# Patient Record
Sex: FEMALE | Race: AMERICAN INDIAN OR ALASKA NATIVE
[De-identification: names, ages, dates, MRNs, and addresses within clinical notes are randomized per-mention and may not be internally consistent; named-entity substitution may affect disease eponyms.]

---

## 2018-03-02 ENCOUNTER — HOSPITAL ENCOUNTER (EMERGENCY)
Dept: HOSPITAL 31 - C.ER | Age: 23
Discharge: HOME | End: 2018-03-02
Payer: MEDICAID

## 2018-03-02 VITALS — OXYGEN SATURATION: 98 % | TEMPERATURE: 97.1 F | SYSTOLIC BLOOD PRESSURE: 132 MMHG | DIASTOLIC BLOOD PRESSURE: 70 MMHG

## 2018-03-02 VITALS — HEART RATE: 84 BPM | RESPIRATION RATE: 20 BRPM

## 2018-03-02 DIAGNOSIS — R11.0: ICD-10-CM

## 2018-03-02 DIAGNOSIS — D64.9: ICD-10-CM

## 2018-03-02 DIAGNOSIS — R10.13: Primary | ICD-10-CM

## 2018-03-02 LAB
ALBUMIN SERPL-MCNC: 3.9 G/DL (ref 3.5–5)
ALBUMIN/GLOB SERPL: 1.2 {RATIO} (ref 1–2.1)
ALT SERPL-CCNC: 22 U/L (ref 9–52)
AST SERPL-CCNC: 19 U/L (ref 14–36)
BACTERIA #/AREA URNS HPF: (no result) /[HPF]
BASOPHILS # BLD AUTO: 0 K/UL (ref 0–0.2)
BASOPHILS NFR BLD: 0.5 % (ref 0–2)
BILIRUB UR-MCNC: NEGATIVE MG/DL
BUN SERPL-MCNC: 12 MG/DL (ref 7–17)
CALCIUM SERPL-MCNC: 8.9 MG/DL (ref 8.6–10.4)
EOSINOPHIL # BLD AUTO: 0.1 K/UL (ref 0–0.7)
EOSINOPHIL NFR BLD: 0.6 % (ref 0–4)
ERYTHROCYTE [DISTWIDTH] IN BLOOD BY AUTOMATED COUNT: 16.7 % (ref 11.5–14.5)
GFR NON-AFRICAN AMERICAN: > 60
GLUCOSE UR STRIP-MCNC: NORMAL MG/DL
HCG,QUALITATIVE URINE: NEGATIVE
HGB BLD-MCNC: 9.1 G/DL (ref 11–16)
LEUKOCYTE ESTERASE UR-ACNC: NEGATIVE LEU/UL
LIPASE: 133 U/L (ref 23–300)
LYMPHOCYTES # BLD AUTO: 2.4 K/UL (ref 1–4.3)
LYMPHOCYTES NFR BLD AUTO: 29.2 % (ref 20–40)
MCH RBC QN AUTO: 25.5 PG (ref 27–31)
MCHC RBC AUTO-ENTMCNC: 32.8 G/DL (ref 33–37)
MCV RBC AUTO: 77.7 FL (ref 81–99)
MONOCYTES # BLD: 0.7 K/UL (ref 0–0.8)
MONOCYTES NFR BLD: 9 % (ref 0–10)
NEUTROPHILS # BLD: 5 K/UL (ref 1.8–7)
NEUTROPHILS NFR BLD AUTO: 60.7 % (ref 50–75)
NRBC BLD AUTO-RTO: 0 % (ref 0–2)
PH UR STRIP: 6 [PH] (ref 5–8)
PLATELET # BLD: 291 K/UL (ref 130–400)
PMV BLD AUTO: 7.8 FL (ref 7.2–11.7)
PROT UR STRIP-MCNC: NEGATIVE MG/DL
RBC # BLD AUTO: 3.59 MIL/UL (ref 3.8–5.2)
RBC # UR STRIP: NEGATIVE /UL
SP GR UR STRIP: 1.02 (ref 1–1.03)
SQUAMOUS EPITHIAL: 8 /HPF (ref 0–5)
URINE NITRATE: NEGATIVE
UROBILINOGEN UR-MCNC: 4 MG/DL (ref 0.2–1)
WBC # BLD AUTO: 8.2 K/UL (ref 4.8–10.8)

## 2018-03-02 PROCEDURE — 84703 CHORIONIC GONADOTROPIN ASSAY: CPT

## 2018-03-02 PROCEDURE — 99285 EMERGENCY DEPT VISIT HI MDM: CPT

## 2018-03-02 PROCEDURE — 74176 CT ABD & PELVIS W/O CONTRAST: CPT

## 2018-03-02 PROCEDURE — 80053 COMPREHEN METABOLIC PANEL: CPT

## 2018-03-02 PROCEDURE — 85025 COMPLETE CBC W/AUTO DIFF WBC: CPT

## 2018-03-02 PROCEDURE — 96361 HYDRATE IV INFUSION ADD-ON: CPT

## 2018-03-02 PROCEDURE — 96374 THER/PROPH/DIAG INJ IV PUSH: CPT

## 2018-03-02 PROCEDURE — 81001 URINALYSIS AUTO W/SCOPE: CPT

## 2018-03-02 PROCEDURE — 83690 ASSAY OF LIPASE: CPT

## 2018-03-02 NOTE — C.PDOC
History Of Present Illness





22 yo female come in for evaluation of nausea for past week. Pt sts, "today, 

felt worse, almost vomited. developed some headache, back pain. Otherwise, pt 

denies change in appetite, recent illness, fever, chills, sore throat, cough, CP

, SOB, dyspnea, wheezing, vomiting, diarrhea, UTi sx. LNMP -2/18/18. Ambulate 

to ED for evaluation, not in any apparent distress.


Time Seen by Provider: 03/02/18 00:42


Chief Complaint (Nursing): Abdominal Pain


History Per: Patient





Past Medical History


Reviewed: Historical Data, Nursing Documentation, Vital Signs


Vital Signs: 


 Last Vital Signs











Temp  98 F   03/02/18 00:11


 


Pulse  84   03/02/18 00:11


 


Resp  20   03/02/18 00:11


 


BP  103/47 L  03/02/18 00:11


 


Pulse Ox  100   03/02/18 00:53














- Medical History


PMH: No Chronic Diseases


Surgical History: No Surg Hx


Family History: States: No Known Family Hx





- Social History


Hx Alcohol Use: No


Hx Substance Use: No





- Immunization History


Hx Tetanus Toxoid Vaccination: No


Hx Pneumococcal Vaccination: No





Review Of Systems


Except As Marked, All Systems Reviewed And Found Negative.


Constitutional: Positive for: Chills.  Negative for: Fever


ENT: Negative for: Ear Discharge, Nose Discharge, Nose Congestion, Throat Pain, 

Throat Swelling


Cardiovascular: Negative for: Chest Pain


Respiratory: Negative for: Cough, Shortness of Breath, Wheezing


Gastrointestinal: Positive for: Nausea, Abdominal Pain.  Negative for: Vomiting

, Diarrhea, Melena, Hematochezia, Hematemesis


Genitourinary: Negative for: Dysuria, Frequency


Musculoskeletal: Positive for: Back Pain.  Negative for: Neck Pain


Skin: Negative for: Rash


Neurological: Positive for: Headache.  Negative for: Weakness, Numbness, 

Altered Mental Status, Dizziness





Physical Exam





- Physical Exam


Appears: Well, Non-toxic, No Acute Distress


Skin: Normal Color, Warm, Dry, No Rash


Head: Normacephalic


Eye(s): bilateral: PERRL


Nose: No Flaring, No Discharge


Oral Mucosa: Moist, No Drooling


Throat: No Erythema


Neck: Trachea Midline, Supple


Cardiovascular: Rhythm Regular


Respiratory: No Decreased Breath Sounds, No Accessory Muscle Use, No Stridor, 

No Wheezing


Gastrointestinal/Abdominal: Soft, Tenderness (mild epigastric), No Distention, 

No Guarding, No Rebound


Back: No CVA Tenderness


Extremity: Normal ROM, No Deformity, No Swelling


Neurological/Psych: Oriented x3, Normal Speech





ED Course And Treatment





- Laboratory Results


Result Diagrams: 


 03/02/18 01:32





 03/02/18 01:32


Urine Pregnancy POC: Negative


O2 Sat by Pulse Oximetry: 100


Pulse Ox Interpretation: Normal





- CT Scan/US


  ** CT abd/pelvis


Other Rad Studies (CT/US): Radiology Report Reviewed


CT/US Interpretation: Pt ois allergic to shelfis.  Study dry, no contrast.  EXAM

:  CT Abdomen and Pelvis Without Intravenous Contrast.  CLINICAL HISTORY:  23 

years old, female; Pain; Abdominal pain; Additional info: Llq pain.  TECHNIQUE:

  Axial computed tomography images of the abdomen and pelvis without 

intravenous contrast. All CT.  scans at this facility use one or more dose 

reduction techniques, viz.: automated exposure control;.  ma/kV adjustment per 

patient size (including targeted exams where dose is matched to indication; 

i.e.  head); or iterative reconstruction technique.  Coronal and sagittal 

reformatted images were created and reviewed.  COMPARISON:  No relevant prior 

studies available.  FINDINGS:  Lower thorax: No acute findings.  ABDOMEN:  Liver

: Unremarkable.  Gallbladder and bile ducts: No calcified stones. No ductal 

dilation.  Pancreas: Unremarkable. No ductal dilation.  Spleen: No 

splenomegaly.  Adrenals: No mass.  Kidneys and ureters: No renal calculi. No 

hydronephrosis.  Stomach and bowel: Apparent mild mural thickening vs 

underdistention of distal rectum. No.  obstruction.  Appendix: Normal caliber. 

No definite inflammation.  PELVIS:  Bladder: Unremarkable. No stones.  

Reproductive: Suboptimal delineation of uterus and adnexa.  ABDOMEN and PELVIS:

  Intraperitoneal space: Small free fluid within pelvis. No free air.  Bones/

joints: No acute fracture.  Soft tissues: Unremarkable.  Vasculature: 

Unremarkable. No aneurysm.  Lymph nodes: No pathologically enlarged lymph 

nodes.  IMPRESSION:  1. Small pelvic ascites with suboptimal delineation of 

uterus/adnexa. Suggest ultrasound.  2. Possible mild proctitis versus 

underdistention. Clinical correlation is needed.  3. Incidental/non-acute 

findings are described above.  Thank you for allowing us to participate in the 

care of your patient


Progress Note: Pt was OBS in Ed for 3 hours and remained stable.  On re-eval, 

pt is afberile, hemodynamicaly stable.  Non-toxic.  Tolerate PO well in ED.  

PulseEOx 100% RA.  ENT: no acute findings.  neck: SUpple, (-) meningeal sign, (-

) JVD.  Lungs: CTA B/L, BS equal B/L.  CVS: (+)S1S2, reg.  Abd: benign, (-) 

guarding, (-) rebound.  back: (-) CVA tenderness.  Blood owrk review, low HB 

noted. Pt admits, has hx of anemia.  Otherwise, diagnostics appeafs without 

acute findings.  UA- normal study.  Imaging review and discussed with pt, 

recommend on outpt bases,  F/U outpt with GYN for Pelvic US, ottherwise, no 

acute findings noted.  Pt has cliunical findings c/w nausea, epigastric pain, 

low Hb, hx of anemia.  Pt advised.  ref. to  F/u with PMD, GI, Hematlogy and 

GYN in 2-3 days for re-eavl.  return to ED if any worsening or new changes.





Disposition


Counseled Patient/Family Regarding: Studies Performed, Diagnosis, Need For 

Followup, Rx Given





- Disposition


Referrals: 


[facility] [Outside]


Disposition: HOME/ ROUTINE


Disposition Time: 03:42


Condition: STABLE


Additional Instructions: 


ENCOURAGE FLUIDS


DIET RESTRICTION FOR 1 WEEK, AVOID FAT, GREASY FOOD


ENCOURAGE FLUIDS


TAKE MEDICATION AS PRESCRIBED


FOLLOW UP WITH PMD, HEMATOLOGIST IN 1-2 DAYS FOR RE-EVALUATION.


RETURN TO ED IF ANY WORSENING OR NEW CHANGES.


Prescriptions: 


Ondansetron ODT [Zofran ODT] 1 odt PO BID PRN #6 odt


 PRN Reason: Nausea/Vomiting


Pantoprazole Sodium [Protonix] 20 mg PO BID #20 tablet.


Instructions:  Anemia Caused by Low Iron, Nausea and Vomiting, Adult, Dyspepsia 

(DC)


Forms:  [facility] (English)





- Clinical Impression


Clinical Impression: 


 Nausea, Epigastric pain, Anemia

## 2018-03-02 NOTE — CT
EXAM:

  CT Abdomen and Pelvis Without Intravenous Contrast



CLINICAL HISTORY:

  23 years old, female; Pain; Abdominal pain; Additional info: Llq pain



TECHNIQUE:

  Axial computed tomography images of the abdomen and pelvis without 

intravenous contrast.  All CT scans at this facility use one or more dose 

reduction techniques, viz.: automated exposure control; ma/kV adjustment per 

patient size (including targeted exams where dose is matched to indication; 

i.e. head); or iterative reconstruction technique.

  Coronal and sagittal reformatted images were created and reviewed.



COMPARISON:

  No relevant prior studies available.



FINDINGS:

  Lower thorax: No acute findings.



 ABDOMEN:

  Liver:  Unremarkable.

  Gallbladder and bile ducts:  No calcified stones.  No ductal dilation.

  Pancreas:  Unremarkable.  No ductal dilation.

  Spleen:  No splenomegaly.

  Adrenals:  No mass.

  Kidneys and ureters:  No renal calculi.  No hydronephrosis.

  Stomach and bowel:  Apparent mild mural thickening vs underdistention of 

distal rectum.  No obstruction.

  Appendix:  Normal caliber.  No definite inflammation.



 PELVIS:

  Bladder:  Unremarkable.  No stones.

  Reproductive:  Suboptimal delineation of uterus and adnexa.



 ABDOMEN and PELVIS:

  Intraperitoneal space:  Small free fluid within pelvis.  No free air.

  Bones/joints:  No acute fracture.

  Soft tissues:  Unremarkable.

  Vasculature:  Unremarkable.  No aneurysm.

  Lymph nodes:  No pathologically enlarged lymph nodes.



IMPRESSION:     

1.  Small pelvic ascites with suboptimal delineation of uterus/adnexa. Suggest 

ultrasound.

2.  Possible mild proctitis versus underdistention.  Clinical correlation is 

needed.

3.  Incidental/non-acute findings are described above.

## 2018-03-06 ENCOUNTER — HOSPITAL ENCOUNTER (EMERGENCY)
Dept: HOSPITAL 31 - C.ER | Age: 23
Discharge: HOME | End: 2018-03-06
Payer: MEDICAID

## 2018-03-06 VITALS
RESPIRATION RATE: 18 BRPM | TEMPERATURE: 99.8 F | DIASTOLIC BLOOD PRESSURE: 56 MMHG | SYSTOLIC BLOOD PRESSURE: 92 MMHG | HEART RATE: 71 BPM

## 2018-03-06 VITALS — OXYGEN SATURATION: 98 %

## 2018-03-06 DIAGNOSIS — J11.1: Primary | ICD-10-CM

## 2018-03-06 LAB
ANISOCYTOSIS BLD QL SMEAR: SLIGHT
BACTERIA #/AREA URNS HPF: (no result) /[HPF]
BASOPHILS # BLD AUTO: 0 K/UL (ref 0–0.2)
BASOPHILS NFR BLD: 0.2 % (ref 0–2)
BILIRUB UR-MCNC: NEGATIVE MG/DL
BUN SERPL-MCNC: 12 MG/DL (ref 7–17)
CALCIUM SERPL-MCNC: 9.1 MG/DL (ref 8.6–10.4)
EOSINOPHIL # BLD AUTO: 0 K/UL (ref 0–0.7)
EOSINOPHIL NFR BLD: 0 % (ref 0–4)
ERYTHROCYTE [DISTWIDTH] IN BLOOD BY AUTOMATED COUNT: 16.8 % (ref 11.5–14.5)
GFR NON-AFRICAN AMERICAN: > 60
GLUCOSE UR STRIP-MCNC: NORMAL MG/DL
HCG,QUALITATIVE URINE: NEGATIVE
HGB BLD-MCNC: 9.8 G/DL (ref 11–16)
HYPOCHROMIC: SLIGHT
LEUKOCYTE ESTERASE UR-ACNC: (no result) LEU/UL
LYMPHOCYTE: 5 % (ref 20–40)
LYMPHOCYTES # BLD AUTO: 0.6 K/UL (ref 1–4.3)
LYMPHOCYTES NFR BLD AUTO: 5.2 % (ref 20–40)
MCH RBC QN AUTO: 25.1 PG (ref 27–31)
MCHC RBC AUTO-ENTMCNC: 32.3 G/DL (ref 33–37)
MCV RBC AUTO: 77.9 FL (ref 81–99)
MICROCYTES BLD QL SMEAR: SLIGHT
MONOCYTE: 11 % (ref 0–10)
MONOCYTES # BLD: 1.1 K/UL (ref 0–0.8)
MONOCYTES NFR BLD: 9.4 % (ref 0–10)
NEUTROPHILS # BLD: 10 K/UL (ref 1.8–7)
NEUTROPHILS NFR BLD AUTO: 84 % (ref 50–75)
NEUTROPHILS NFR BLD AUTO: 85.2 % (ref 50–75)
NRBC BLD AUTO-RTO: 0 % (ref 0–2)
PH UR STRIP: 5 [PH] (ref 5–8)
PLATELET # BLD EST: NORMAL 10*3/UL
PLATELET # BLD: 280 K/UL (ref 130–400)
PMV BLD AUTO: 7.1 FL (ref 7.2–11.7)
POIKILOCYTOSIS BLD QL SMEAR: SLIGHT
PROT UR STRIP-MCNC: NEGATIVE MG/DL
RBC # BLD AUTO: 3.89 MIL/UL (ref 3.8–5.2)
RBC # UR STRIP: NEGATIVE /UL
SP GR UR STRIP: 1.03 (ref 1–1.03)
SQUAMOUS EPITHIAL: 10 /HPF (ref 0–5)
TARGETS BLD QL SMEAR: SLIGHT
TOTAL CELLS COUNTED BLD: 100
URINE NITRATE: NEGATIVE
UROBILINOGEN UR-MCNC: 2 MG/DL (ref 0.2–1)
WBC # BLD AUTO: 11.7 K/UL (ref 4.8–10.8)

## 2018-03-06 PROCEDURE — 96361 HYDRATE IV INFUSION ADD-ON: CPT

## 2018-03-06 PROCEDURE — 96374 THER/PROPH/DIAG INJ IV PUSH: CPT

## 2018-03-06 PROCEDURE — 96375 TX/PRO/DX INJ NEW DRUG ADDON: CPT

## 2018-03-06 PROCEDURE — 87804 INFLUENZA ASSAY W/OPTIC: CPT

## 2018-03-06 PROCEDURE — 81001 URINALYSIS AUTO W/SCOPE: CPT

## 2018-03-06 PROCEDURE — 87086 URINE CULTURE/COLONY COUNT: CPT

## 2018-03-06 PROCEDURE — 85025 COMPLETE CBC W/AUTO DIFF WBC: CPT

## 2018-03-06 PROCEDURE — 71046 X-RAY EXAM CHEST 2 VIEWS: CPT

## 2018-03-06 PROCEDURE — 87040 BLOOD CULTURE FOR BACTERIA: CPT

## 2018-03-06 PROCEDURE — 80048 BASIC METABOLIC PNL TOTAL CA: CPT

## 2018-03-06 PROCEDURE — 99284 EMERGENCY DEPT VISIT MOD MDM: CPT

## 2018-03-06 PROCEDURE — 84703 CHORIONIC GONADOTROPIN ASSAY: CPT

## 2018-03-06 NOTE — RAD
HISTORY:

Shortness of breath.



COMPARISON:

No prior.



TECHNIQUE:

Chest PA and lateral



FINDINGS:



LUNGS:

No active pulmonary disease.



PLEURA:

No significant pleural effusion identified. No pneumothorax apparent.



CARDIOVASCULAR:

Normal.



OSSEOUS STRUCTURES:

No significant abnormalities.



VISUALIZED UPPER ABDOMEN:

Normal.



OTHER FINDINGS:

None.



IMPRESSION:

No active disease.

## 2018-03-06 NOTE — C.PDOC
History Of Present Illness





22 yo female w/PMHx of anemia come in for evaluation of fever, bodyaches, 

malaise, decrease appetite, sore throat, headache gradually developed for past 

few days. Pt admits, was seen here 4 days ago due to abd. pain. Otherwise, pt 

denies lethargy, severe headache, drooling, dysphagia, dyspnea, CP, SOB, 

wheezing, cough, abd. pain, diarrhea, back pain, UTI sx. Ambulate to ED for 

evaluation, appears in pain.





HPI: Influenza


Time Seen by Provider: 03/06/18 13:35


Chief Complaint: Flu-like Symptoms


History Per: Patient





Past Medical History


Reviewed: Historical Data, Nursing Documentation, Vital Signs


Vital Signs: 


 Last Vital Signs











Temp  102 F H  03/06/18 13:30


 


Pulse  114 H  03/06/18 13:30


 


Resp  20   03/06/18 13:30


 


BP  94/60 L  03/06/18 13:30


 


Pulse Ox  98   03/06/18 13:30














- Medical History


PMH: Anemia


Family History: States: No Known Family Hx





- Social History


Hx Alcohol Use: No


Hx Substance Use: No





- Immunization History


Hx Tetanus Toxoid Vaccination: No


Hx Pneumococcal Vaccination: No





Review Of Systems


Except As Marked, All Systems Reviewed And Found Negative.


Constitutional: Positive for: Fever, Chills, Malaise


Eyes: Negative for: Vision Change


ENT: Positive for: Nose Discharge, Nose Congestion, Throat Pain, Throat 

Swelling.  Negative for: Ear Discharge


Cardiovascular: Negative for: Chest Pain


Respiratory: Negative for: Cough, Shortness of Breath, Wheezing


Gastrointestinal: Positive for: Nausea.  Negative for: Vomiting, Abdominal Pain

, Diarrhea


Genitourinary: Negative for: Dysuria


Musculoskeletal: Negative for: Neck Pain


Skin: Negative for: Rash


Neurological: Positive for: Headache.  Negative for: Altered Mental Status, 

Dizziness





Physical Exam





- Physical Exam


Appears: Well, No Acute Distress


Skin: Normal Color, Warm, Dry, No Rash


Head: Normacephalic


Eye(s): bilateral: PERRL


Ear(s): Bilateral: Normal


Nose: No Flaring, No Discharge


Oral Mucosa: Moist, No Drooling


Throat: Erythema (mod), Exudate (Left tonsilar), No Drooling


Neck: Trachea Midline, Supple, Other ((-) meningeal sign)


Cardiovascular: Rhythm Regular, No Murmur, No JVD


Respiratory: No Decreased Breath Sounds, No Accessory Muscle Use, No Stridor, 

No Wheezing


Gastrointestinal/Abdominal: Soft, No Tenderness, No Distention, No Guarding


Back: No CVA Tenderness


Extremity: Normal ROM, No Deformity, No Swelling


Neurological/Psych: Oriented x3, Normal Speech





- Laboratory Results


Result Diagrams: 


 03/06/18 14:17





 03/06/18 14:17


Urine Pregnancy POC: Negative





- ECG


O2 Sat by Pulse Oximetry: 98


Pulse Ox Interpretation: Normal





- Other Rad


  ** CXR


X-Ray: Read By Radiologist


X-Ray Interpretation: no acute findings





- Progress


ED Course And Treament: 





Pt was OBS in ED for 2.5 hours and reports moderate improvement in sx. Pt sts, 

" Im hungry now, want something to eat".


On re-eval, pt fever improved, hemodynamicaly stable.


NOn-toxic. Tolerate Po well in ED.


PuslEOx n98% RA


neck: SUpple, (-) meningeal sign.


ENT: exam c/w acute pharyngitis. Uvula midline, no edema.


Lungs: CTA B/L, BS equal B/L.


ABd: benign, (-) guarding, (-) rebound.


Back: (-) CVA tenderness.


Neurologicaly intact.


Blood work review, appears similar to previous visit from 3/2/18, no new acute 

changes.


CXR- normal study.


results review and discussed with pt.


Pt has clinical findings c/w acute pharyngitis r/o Influenza-like illness.


Pt advised.


ref. to f/u with PMD in 2-3 days for re-eval.


Return to ED if any worsening or new changes.








Disposition


Counseled Patient/Family Regarding: Studies Performed, Diagnosis, Need For 

Followup, Rx Given





- Disposition


Referrals: 


[facility] [Outside]


Disposition: HOME/ ROUTINE


Disposition Time: 15:46


Condition: STABLE


Additional Instructions: 


Encourage fluids





Take medication as prescribed





Bedrest for 2 days





Follow up with PMD in 2-3 days for re-evaluation.


Return to ED if any worsening or new changes.


Prescriptions: 


Amoxicillin/Clavulanate [Augmentin 875 MG-125 MG] 1 tab PO BID #14 tab


Ibuprofen [Motrin Tab] 400 mg PO Q6 #14 tab


Oseltamivir Phosphate [Tamiflu] 75 mg PO BID #10 capsule


Instructions:  Flu, Adult (DC), Sore Throat, Adult (DC)


Forms:  [facility] (English)





- Clinical Impression


Clinical Impression: 


 Influenza-like illness, Pharyngitis

## 2018-10-28 ENCOUNTER — HOSPITAL ENCOUNTER (EMERGENCY)
Dept: HOSPITAL 31 - C.ER | Age: 23
Discharge: HOME | End: 2018-10-28
Payer: SELF-PAY

## 2018-10-28 VITALS
HEART RATE: 77 BPM | OXYGEN SATURATION: 100 % | TEMPERATURE: 98.9 F | RESPIRATION RATE: 20 BRPM | DIASTOLIC BLOOD PRESSURE: 58 MMHG | SYSTOLIC BLOOD PRESSURE: 103 MMHG

## 2018-10-28 DIAGNOSIS — J32.9: Primary | ICD-10-CM

## 2018-10-28 NOTE — C.PDOC
History Of Present Illness


23 year old female presents to the ED complaining of headache for one week. 

Associated symptoms include lightheadedness, throat pain, left ear discomfort 

and nausea. Denies vomiting, coughing, nasal congestion. Reports she took 

Tylenol and Motrin with minimal relief. LN 10/12.18. 


Time Seen by Provider: 10/28/18 17:38


Chief Complaint (Nursing): Headache


History Per: Patient


History/Exam Limitations: no limitations


Onset/Duration Of Symptoms: Days


Current Symptoms Are (Timing): Still Present


Quality: "Pain"


Associated Symptoms: Nausea





Past Medical History


Reviewed: Historical Data, Nursing Documentation, Vital Signs


Vital Signs: 





                                Last Vital Signs











Temp  98.9 F   10/28/18 17:17


 


Pulse  77   10/28/18 17:17


 


Resp  20   10/28/18 17:17


 


BP  103/58 L  10/28/18 17:17


 


Pulse Ox  100   10/28/18 17:17














- Medical History


PMH: Anemia


Surgical History: No Surg Hx


Family History: States: No Known Family Hx





- Social History


Hx Alcohol Use: No


Hx Substance Use: No





- Immunization History


Hx Tetanus Toxoid Vaccination: No


Hx Pneumococcal Vaccination: No





Review Of Systems


Constitutional: Negative for: Fever, Chills


ENT: Positive for: Throat Pain.  Negative for: Nose Congestion


Cardiovascular: Positive for: Light Headedness


Respiratory: Negative for: Cough


Gastrointestinal: Positive for: Nausea.  Negative for: Vomiting


Neurological: Positive for: Headache





Physical Exam





- Physical Exam


Appears: Non-toxic


Skin: Warm, Dry


Head: Atraumatic, Normacephalic, Other (bilateral maxillary sinuses tenderness)


Eye(s): bilateral: Normal Inspection, EOMI


Ear(s): Bilateral: Normal


Nose: Normal


Oral Mucosa: Moist


Tongue: Normal Appearing


Gingiva: Normal Appearing


Throat: Erythema (mild )


Neck: Supple


Chest: Symmetrical


Cardiovascular: Rhythm Regular


Respiratory: Normal Breath Sounds, No Rales, No Rhonchi, No Wheezing


Extremity: Bilateral: Atraumatic, Normal Color And Temperature, Normal ROM


Neurological/Psych: Oriented x3, Normal Speech


Gait: Steady





ED Course And Treatment





- Laboratory Results


Urine Pregnancy POC: Negative


O2 Sat by Pulse Oximetry: 100 (RA)


Pulse Ox Interpretation: Normal





Medical Decision Making


Medical Decision Making: 


Plan: Amoxicillin 500mg PO


Patient remained afebrile alert and oriented with stable vital signs during ER 

evaluation. No nuchal rigidity or neuro deficits on exam. Patient treated 

clinically for sinus infection. Recommend decongestant and antihistamine to help

with symptoms. Patient feels comfortable going home and will be discharged. 

Patient given follow up instructions. Instructed to return to ER if symptoms 

worsen or new symptoms arise.








Disposition


Counseled Patient/Family Regarding: Diagnosis, Need For Followup, Rx Given





- Disposition


Referrals: 


[facility] [Outside]


[facility] [Outside]


[facility] [Outside]


Disposition: HOME/ ROUTINE


Disposition Time: 18:15


Condition: GOOD


Additional Instructions: 


Take Tylenol or Ibuprofen for headache or fever. Take decongestant or 

antihistamine such as Sudafed, Mucinex, Allegra or Claritin to help with sinus 

pressure. Try nasal spray such as flonase if having congestion. Follow up with 

your doctor


Prescriptions: 


Amoxicillin 875 mg PO BID #14 tablet


Instructions:  Sinusitis, Adult (DC)


Forms:  [facility] (English)





- POA


Present On Arrival: None





- Clinical Impression


Clinical Impression: 


 Sinusitis








- PA / NP / Resident Statement


MD/DO has reviewed & agrees with the documentation as recorded.





- Scribe Statement


The provider has reviewed the documentation as recorded by the Scribe


Coral Fry








All medical record entries made by the Rene were at my direction and 

personally dictated by me. I have reviewed the chart and agree that the record 

accurately reflects my personal performance of the history, physical exam, 

medical decision making, and the department course for this patient. I have also

 personally directed, reviewed, and agree with the discharge instructions and 

disposition.